# Patient Record
Sex: FEMALE | Race: WHITE | NOT HISPANIC OR LATINO | URBAN - METROPOLITAN AREA
[De-identification: names, ages, dates, MRNs, and addresses within clinical notes are randomized per-mention and may not be internally consistent; named-entity substitution may affect disease eponyms.]

---

## 2018-05-14 ENCOUNTER — OFFICE VISIT (OUTPATIENT)
Dept: RETAIL CLINIC | Facility: CLINIC | Age: 68
End: 2018-05-14

## 2018-05-14 VITALS
OXYGEN SATURATION: 97 % | TEMPERATURE: 98.8 F | HEART RATE: 71 BPM | DIASTOLIC BLOOD PRESSURE: 88 MMHG | SYSTOLIC BLOOD PRESSURE: 128 MMHG

## 2018-05-14 DIAGNOSIS — H65.113 ACUTE MUCOID OTITIS MEDIA OF BOTH EARS: Primary | ICD-10-CM

## 2018-05-14 DIAGNOSIS — J06.9 ACUTE URI: ICD-10-CM

## 2018-05-14 PROCEDURE — 99202 OFFICE O/P NEW SF 15 MIN: CPT | Performed by: NURSE PRACTITIONER

## 2018-05-14 RX ORDER — FLUTICASONE PROPIONATE 50 MCG
1 SPRAY, SUSPENSION (ML) NASAL 2 TIMES DAILY
Qty: 1 BOTTLE | Refills: 0 | Status: SHIPPED | OUTPATIENT
Start: 2018-05-14 | End: 2018-05-28

## 2018-05-14 RX ORDER — MELATONIN
1000 DAILY
COMMUNITY

## 2018-05-14 RX ORDER — SOY ISOFLAVONE 40 MG
TABLET ORAL
COMMUNITY
End: 2023-02-25

## 2018-05-14 RX ORDER — LEVOTHYROXINE SODIUM 88 UG/1
88 TABLET ORAL DAILY
COMMUNITY

## 2018-05-14 RX ORDER — ASPIRIN 81 MG/1
81 TABLET ORAL DAILY
COMMUNITY
End: 2023-02-25

## 2018-05-14 RX ORDER — AMOXICILLIN 875 MG/1
875 TABLET, COATED ORAL 2 TIMES DAILY
Qty: 20 TABLET | Refills: 0 | Status: SHIPPED | OUTPATIENT
Start: 2018-05-14 | End: 2018-05-24

## 2018-05-14 NOTE — PROGRESS NOTES
Moira Du is a 67 y.o.. female.     Sore Throat    This is a new problem. Episode onset: 1 week. The problem has been unchanged. There has been no fever. Associated symptoms include congestion and ear pain (left). Pertinent negatives include no abdominal pain, coughing, diarrhea, headaches or vomiting. Treatments tried: advil, zyrtec. The treatment provided no relief.       The following portions of the patient's history were reviewed and updated as appropriate: allergies, current medications, past family history, past medical history, past social history, past surgical history and problem list.    Review of Systems   Constitutional: Negative for fever.   HENT: Positive for congestion, ear pain (left), rhinorrhea and sore throat (left).    Respiratory: Negative for cough.    Gastrointestinal: Negative for abdominal pain, diarrhea, nausea and vomiting.   Neurological: Negative for headaches.       Objective     Vitals:    05/14/18 1050   BP: 128/88   Pulse: 71   Temp: 98.8 °F (37.1 °C)   TempSrc: Oral   SpO2: 97%       Physical Exam   Constitutional: She is oriented to person, place, and time. She appears well-developed and well-nourished.   HENT:   Head: Normocephalic and atraumatic.   Right Ear: Tympanic membrane is erythematous. A middle ear effusion is present.   Left Ear: Tympanic membrane is erythematous. A middle ear effusion is present.   Nose: Mucosal edema present. Right sinus exhibits no maxillary sinus tenderness and no frontal sinus tenderness. Left sinus exhibits no maxillary sinus tenderness and no frontal sinus tenderness.   Mouth/Throat: Oropharynx is clear and moist. Oropharyngeal exudate: pnd.   Eyes: Conjunctivae are normal. Pupils are equal, round, and reactive to light.   Cardiovascular: Normal rate and regular rhythm.    No murmur heard.  Pulmonary/Chest: Effort normal. She has no wheezes. She has no rhonchi. She has no rales.   Musculoskeletal: Normal range of motion.    Lymphadenopathy:     She has no cervical adenopathy.   Neurological: She is alert and oriented to person, place, and time.   Skin: Skin is warm and dry.   Vitals reviewed.    Assessment/Plan   Lydia was seen today for sore throat.    Diagnoses and all orders for this visit:    Acute mucoid otitis media of both ears  -     amoxicillin (AMOXIL) 875 MG tablet; Take 1 tablet by mouth 2 (Two) Times a Day for 10 days.    Acute URI  -     fluticasone (FLONASE) 50 MCG/ACT nasal spray; 1 spray into each nostril 2 (Two) Times a Day for 14 days.        Patient Instructions   Otitis Media, Adult  Otitis media occurs when there is inflammation and fluid in the middle ear. Your middle ear is a part of the ear that contains bones for hearing as well as air that helps send sounds to your brain.  What are the causes?  This condition is caused by a blockage in the eustachian tube. This tube drains fluid from the ear to the back of the nose (nasopharynx). A blockage in this tube can be caused by an object or by swelling (edema) in the tube. Problems that can cause a blockage include:  · A cold or other upper respiratory infection.  · Allergies.  · An irritant, such as tobacco smoke.  · Enlarged adenoids. The adenoids are areas of soft tissue located high in the back of the throat, behind the nose and the roof of the mouth.  · A mass in the nasopharynx.  · Damage to the ear caused by pressure changes (barotrauma).  What are the signs or symptoms?  Symptoms of this condition include:  · Ear pain.  · A fever.  · Decreased hearing.  · A headache.  · Tiredness (lethargy).  · Fluid leaking from the ear.  · Ringing in the ear.  How is this diagnosed?  This condition is diagnosed with a physical exam. During the exam your health care provider will use an instrument called an otoscope to look into your ear and check for redness, swelling, and fluid. He or she will also ask about your symptoms.  Your health care provider may also order tests,  such as:  · A test to check the movement of the eardrum (pneumatic otoscopy). This test is done by squeezing a small amount of air into the ear.  · A test that changes air pressure in the middle ear to check how well the eardrum moves and whether the eustachian tube is working (tympanogram).  How is this treated?  This condition usually goes away on its own within 3-5 days. But if the condition is caused by a bacteria infection and does not go away own its own, or keeps coming back, your health care provider may:  · Prescribe antibiotic medicines to treat the infection.  · Prescribe or recommend medicines to control pain.  Follow these instructions at home:  · Take over-the-counter and prescription medicines only as told by your health care provider.  · If you were prescribed an antibiotic medicine, take it as told by your health care provider. Do not stop taking the antibiotic even if you start to feel better.  · Keep all follow-up visits as told by your health care provider. This is important.  Contact a health care provider if:  · You have bleeding from your nose.  · There is a lump on your neck.  · You are not getting better in 5 days.  · You feel worse instead of better.  Get help right away if:  · You have severe pain that is not controlled with medicine.  · You have swelling, redness, or pain around your ear.  · You have stiffness in your neck.  · A part of your face is paralyzed.  · The bone behind your ear (mastoid) is tender when you touch it.  · You develop a severe headache.  Summary  · Otitis media is redness, soreness, and swelling of the middle ear.  · This condition usually goes away on its own within 3-5 days.  · If the problem does not go away in 3-5 days, your health care provider may prescribe or recommend medicines to treat your symptoms.  · If you were prescribed an antibiotic medicine, take it as told by your health care provider.  This information is not intended to replace advice given to  you by your health care provider. Make sure you discuss any questions you have with your health care provider.  Document Released: 09/22/2005 Document Revised: 12/08/2017 Document Reviewed: 12/08/2017  [employer] Patient Education © 2017 [employer]        Return if symptoms worsen or fail to improve with urgent care/ER.

## 2018-05-14 NOTE — PATIENT INSTRUCTIONS

## 2021-07-13 ENCOUNTER — HOSPITAL ENCOUNTER (OUTPATIENT)
Dept: PHYSICAL THERAPY | Facility: HOSPITAL | Age: 71
Setting detail: THERAPIES SERIES
Discharge: HOME | End: 2021-07-13

## 2021-07-13 DIAGNOSIS — G89.29 CHRONIC LEFT-SIDED LOW BACK PAIN WITHOUT SCIATICA: Primary | ICD-10-CM

## 2021-07-13 DIAGNOSIS — M54.50 CHRONIC LEFT-SIDED LOW BACK PAIN WITHOUT SCIATICA: Primary | ICD-10-CM

## 2021-07-13 PROCEDURE — 97161 PT EVAL LOW COMPLEX 20 MIN: CPT

## 2021-07-13 NOTE — THERAPY EVALUATION
"    Outpatient Physical Therapy Ortho Initial Evaluation   Cece Randle     Patient Name: Lydia Du  : 1950  MRN: [id number]  Today's Date: 2021      Visit Date: 2021    There is no problem list on file for this patient.       Past Medical History:   Diagnosis Date   • Arthritis    • Disease of thyroid gland     hypothyroidism   • Insomnia    • Vitamin D deficiency         Past Surgical History:   Procedure Laterality Date   • CHOLECYSTECTOMY     • HYSTERECTOMY     • KNEE SURGERY     • PARATHYROID GLAND SURGERY     • TONSILLECTOMY         Visit Dx:     ICD-10-CM ICD-9-CM   1. Chronic left-sided low back pain without sciatica  M54.5 724.2    G89.29 338.29         Patient History     Row Name 21 07             History    Chief Complaint  Difficulty with daily activities;Joint stiffness;Muscle tenderness;Muscle weakness;Pain  -AS      Type of Pain  Back pain  -AS      Brief Description of Current Complaint  Patient states her back \"suddenly started hurting me a year or two ago\". She reports it has gradually worsened and she finally went to an MD about her pain. She states she has had MRI and x-ray but was unable to tell me exactly what the results were.   -AS      Patient/Caregiver Goals  Relieve pain  -AS      Patient's Rating of General Health  Good  -AS      Occupation/sports/leisure activities  works from home  -AS      Patient seeing anyone else for problem(s)?  Dr. Eb Palacios  -AS      What clinical tests have you had for this problem?  X-ray;MRI  -AS      Results of Clinical Tests  unknown  -AS         Pain     Pain Location  Back  -AS      Pain at Present  5  -AS      Pain at Best  0  -AS      Pain at Worst  8  -AS      Pain Frequency  Intermittent  -AS      Pain Description  Aching  -AS      What Performance Factors Make the Current Problem(s) WORSE?  prolonged sitting and standing  -AS      What Performance Factors Make the Current Problem(s) BETTER?  rest, ice  -AS         " Daily Activities    Primary Language  English  -AS      Are you able to read  Yes  -AS      Are you able to write  Yes  -AS      How does patient learn best?  Listening;Reading;Demonstration  -AS      Teaching needs identified  Home Exercise Program;Management of Condition  -AS      Patient is concerned about/has problems with  Flexibility;Performing home management (household chores, shopping, care of dependents);Performing job responsibilities/community activities (work, school,;Sitting;Standing  -AS      Does patient have problems with the following?  None  -AS      Barriers to learning  None  -AS      Pt Participated in POC and Goals  Yes  -AS         Safety    Are you being hurt, hit, or frightened by anyone at home or in your life?  No  -AS      Are you being neglected by a caregiver  No  -AS        User Key  (r) = Recorded By, (t) = Taken By, (c) = Cosigned By    Initials Name Provider Type    AS Anjum Valiente, PT Physical Therapist          PT Ortho     Row Name 07/13/21 0700       Precautions and Contraindications    Precautions/Limitations  no known precautions/limitations  -AS       Subjective Pain    Able to rate subjective pain?  yes  -AS    Pre-Treatment Pain Level  5  -AS    Post-Treatment Pain Level  4  -AS       Posture/Observations    Posture- WNL  Posture is WNL  -AS       Lumbar/SI Special Tests    Standing Flexion Test (SI Dysfunction)  Left:;Negative  -AS    Stork Test (SI Dysfunction)  Left:;Negative  -AS    SLR (Neural Tension)  Left:;Negative  -AS    BARTOLO (hip vs. SI Dysfunction)  Left:;Negative  -AS       Lumbosacral Palpation    SI  Left:;Tender  -AS       Head/Neck/Trunk    Trunk Extension AROM  WNL  -AS    Trunk Flexion AROM  WNL  -AS    Trunk Lt Lateral Flexion AROM  WNL  -AS    Trunk Rt Lateral Flexion AROM  WNL  -AS    Trunk Lt Rotation AROM  WNL  -AS    Trunk Rt Rotation AROM  WNL  -AS       MMT Neck/Trunk    Trunk Flexion MMT, Gross Movement  (3+/5) fair plus  -AS    Trunk  Extension MMT, Gross Movement  (3+/5) fair plus  -AS       MMT Left Lower Ext    Lt Hip Extension MMT, Gross Movement  (4-/5) good minus  -AS    Lt Hip ABduction MMT, Gross Movement  (4-/5) good minus  -AS       Sensation    Sensation WNL?  WNL  -AS    Light Touch  No apparent deficits  -AS       Lower Extremity Flexibility    Hamstrings  Left:;Moderately limited  -AS    Hip External Rotators  Left:;Moderately limited  -AS      User Key  (r) = Recorded By, (t) = Taken By, (c) = Cosigned By    Initials Name Provider Type    AS Anjum Valiente, PT Physical Therapist                      Therapy Education  Given: HEP, Symptoms/condition management, Pain management  Program: New  How Provided: Verbal, Demonstration, Written  Provided to: Patient  Level of Understanding: Teach back education performed, Verbalized, Demonstrated     PT OP Goals     Row Name 07/13/21 0700          PT Short Term Goals    STG Date to Achieve  07/27/21  -AS     STG 1  Patient to demonstrate compliance with her initial HEP for flexibility, ROM and strengthening.  -AS     STG 2  Patient to report low back pain on VAS of 5-6/10 at worst with activity.  -AS     STG 3  Patient to demonstrate improved trunk and LE strength to 4/5 in all planes.  -AS        Long Term Goals    LTG Date to Achieve  08/10/21  -AS     LTG 1  Patient to demonstrate compliance with her advanced HEP for flexibility, ROM and strengthening.  -AS     LTG 2  Patient to report low back pain on VAS of 2-3/10 at worst with activity.  -AS     LTG 3  Patient to demonstrate improved trunk and LE strength to 4+/5 in all planes.  -AS     LTG 4  Patient to report improved function on Back Index by 8-10 points.  -AS        Time Calculation    PT Goal Re-Cert Due Date  08/10/21  -AS       User Key  (r) = Recorded By, (t) = Taken By, (c) = Cosigned By    Initials Name Provider Type    AS Anjum Valiente, PT Physical Therapist          PT Assessment/Plan     Row Name 07/13/21  0700          PT Assessment    Functional Limitations  Limitation in home management;Limitations in community activities;Performance in leisure activities;Performance in work activities  -AS     Impairments  Impaired flexibility;Joint mobility;Muscle strength;Pain;Range of motion  -AS     Assessment Comments  Patient presents to outpatient PT with chronic left sided low back pain. She has good trunk ROM, limited trunk and hip strength, and increased pain with prolonged sitting or standing activities. Patient is tender to left SI joint upon palpation, however, with assessment she presents with normal left SI joint mobility.  -AS     Please refer to paper survey for additional self-reported information  Yes  -AS     Rehab Potential  Good  -AS     Patient/caregiver participated in establishment of treatment plan and goals  Yes  -AS     Patient would benefit from skilled therapy intervention  Yes  -AS        PT Plan    PT Frequency  2x/week;3x/week  -AS     Predicted Duration of Therapy Intervention (PT)  4 weeks  -AS     Planned CPT's?  PT RE-EVAL: 50205;PT THER PROC EA 15 MIN: 87613;PT THER ACT EA 15 MIN: 94438;PT MANUAL THERAPY EA 15 MIN: 57761;PT NEUROMUSC RE-EDUCATION EA 15 MIN: 58858  -AS       User Key  (r) = Recorded By, (t) = Taken By, (c) = Cosigned By    Initials Name Provider Type    AS Anjum Valiente, PT Physical Therapist            OP Exercises     Row Name 07/13/21 0700             Subjective Pain    Able to rate subjective pain?  yes  -AS      Pre-Treatment Pain Level  5  -AS      Post-Treatment Pain Level  4  -AS         Exercise 1    Exercise Name 1  Milo. HS Stretch  -AS      Reps 1  10  -AS      Time 1  10 sec hold each  -AS         Exercise 2    Exercise Name 2  Milo. Piriformis Stretch  -AS      Reps 2  10  -AS      Time 2  10 sec hold each  -AS         Exercise 3    Exercise Name 3  PPT  -AS      Reps 3  25  -AS      Time 3  5 sec hold each  -AS         Exercise 4    Exercise Name 4  PPT  Ball Squeeze  -AS         Exercise 5    Exercise Name 5  PPT with Marches  -AS         Exercise 6    Exercise Name 6  Supine Clams  -AS      Reps 6  25  -AS      Time 6  Black  -AS         Exercise 7    Exercise Name 7  Bridge vs Band  -AS      Reps 7  --  -AS      Time 7  --  -AS         Exercise 8    Exercise Name 8  Seated Hip IR/ER vs Band  -AS        User Key  (r) = Recorded By, (t) = Taken By, (c) = Cosigned By    Initials Name Provider Type    AS Anjum Valiente, PT Physical Therapist                        Outcome Measure Options: Other Outcome Measure  Other Outcome Measure Tool Used  Other Outcome Measure Tool Comments: Back Index - 11      Time Calculation:     Start Time: 0651  Stop Time: 0747  Time Calculation (min): 56 min     Therapy Charges for Today     Code Description Service Date Service Provider Modifiers Qty    27752139773 HC PT EVAL LOW COMPLEXITY 4 7/13/2021 Anjum Valiente, PT GP 1          PT G-Codes  Outcome Measure Options: Other Outcome Measure         Anjum Valiente, SINGH  7/13/2021

## 2021-07-21 ENCOUNTER — APPOINTMENT (OUTPATIENT)
Dept: PHYSICAL THERAPY | Facility: HOSPITAL | Age: 71
End: 2021-07-21